# Patient Record
Sex: MALE | Race: OTHER | NOT HISPANIC OR LATINO | URBAN - METROPOLITAN AREA
[De-identification: names, ages, dates, MRNs, and addresses within clinical notes are randomized per-mention and may not be internally consistent; named-entity substitution may affect disease eponyms.]

---

## 2017-02-07 ENCOUNTER — APPOINTMENT (RX ONLY)
Dept: URBAN - METROPOLITAN AREA CLINIC 24 | Facility: CLINIC | Age: 27
Setting detail: DERMATOLOGY
End: 2017-02-07

## 2017-02-07 DIAGNOSIS — L81.4 OTHER MELANIN HYPERPIGMENTATION: ICD-10-CM

## 2017-02-07 PROBLEM — L81.9 DISORDER OF PIGMENTATION, UNSPECIFIED: Status: ACTIVE | Noted: 2017-02-07

## 2017-02-07 PROCEDURE — ? COUNSELING

## 2017-02-07 PROCEDURE — ? OTHER

## 2017-02-07 PROCEDURE — 99201: CPT

## 2017-02-07 PROCEDURE — ? PRESCRIPTION

## 2017-02-07 RX ORDER — HYDROQUINONE 4 %
CREAM (GRAM) TOPICAL
Qty: 1 | Refills: 3 | Status: ERX | COMMUNITY
Start: 2017-02-07

## 2017-02-07 RX ADMIN — Medication: at 16:46

## 2017-02-07 ASSESSMENT — LOCATION SIMPLE DESCRIPTION DERM
LOCATION SIMPLE: LEFT CHEEK
LOCATION SIMPLE: RIGHT CHEEK

## 2017-02-07 ASSESSMENT — LOCATION ZONE DERM: LOCATION ZONE: FACE

## 2017-02-07 ASSESSMENT — LOCATION DETAILED DESCRIPTION DERM
LOCATION DETAILED: RIGHT INFERIOR CENTRAL MALAR CHEEK
LOCATION DETAILED: LEFT INFERIOR CENTRAL MALAR CHEEK

## 2017-02-07 NOTE — PROCEDURE: OTHER
Detail Level: Simple
Other (Free Text): Pt hasn't had any rash or inflammation in the area affected. Pt hasn't started had any new medication. Pt is advised to apply Elta sunscreen daily along with the hydroquinone.
Note Text (......Xxx Chief Complaint.): This diagnosis correlates with the